# Patient Record
Sex: MALE | Race: WHITE | NOT HISPANIC OR LATINO | Employment: UNEMPLOYED | URBAN - METROPOLITAN AREA
[De-identification: names, ages, dates, MRNs, and addresses within clinical notes are randomized per-mention and may not be internally consistent; named-entity substitution may affect disease eponyms.]

---

## 2023-03-20 ENCOUNTER — TELEPHONE (OUTPATIENT)
Dept: GASTROENTEROLOGY | Facility: CLINIC | Age: 33
End: 2023-03-20
Payer: COMMERCIAL

## 2023-03-20 NOTE — TELEPHONE ENCOUNTER
Patients mom Suellen called stating Clinton's Peg tube button has been leaking , some days more then others but she is really concerned due to the foul odor . She called Dr. Manning and he did put him on anti biotics although she wanted to see if the 28  F  2.7 cm was an option.  States the  last time it was changed the 28 was unavailable? In [facility] the last Peg tube change I see was 7/19/21 and it says a 28 was used ??

## 2023-03-22 NOTE — TELEPHONE ENCOUNTER
Dr. Rodriguez just called and notified me that [facility] just informed him that they do not have that size peg tube available. It is not available for them to order apparently. They are looking for alternate options but we will need to hold off on scheduling patient until [facility] lab can find another option. Disregard Dr. Rodriguez's message saying they were able to order it.  MLC

## 2023-03-23 NOTE — TELEPHONE ENCOUNTER
Ok, I called Mrs Ken and informed her of the situation , she understands and will wait to hear from our office with other options after [facility] gets back with Dr. Rodriguez.

## 2023-07-25 ENCOUNTER — TELEPHONE (OUTPATIENT)
Dept: GASTROENTEROLOGY | Facility: CLINIC | Age: 33
End: 2023-07-25
Payer: COMMERCIAL

## 2023-07-25 NOTE — TELEPHONE ENCOUNTER
----- Message from Neeta Sigala sent at 7/25/2023 10:25 AM CDT -----  Contact: madeline _ suellen  Type:  Sooner Apoointment Request    Caller is requesting a sooner appointment.  Caller declined first available appointment listed below.  Caller will not accept being placed on the waitlist and is requesting a message be sent to doctor.    Name of Caller: Suellen  When is the first available appointment? Oct 2023  Symptoms: seen Dr at previous prac/ change feeding tube/ button  Would the patient rather a call back or a response via [facility]? phone  Best Call Back Number: [phone]  Additional Information:

## 2023-07-25 NOTE — TELEPHONE ENCOUNTER
Do you want to direct schedule it? Last one I see in [facility] was 9/9/2019. Was supposed to have one in 2021 but I dont see an reports on it in either [facility] or [facility]. I remember his mother requesting specific supplies before so I wasn't sure if you needed to see him first. -KG

## 2023-07-31 RX ORDER — BETAINE 1 G/G
POWDER, FOR SOLUTION ORAL
COMMUNITY
Start: 2023-07-24

## 2023-07-31 RX ORDER — CARVEDILOL 6.25 MG/1
1 TABLET ORAL 2 TIMES DAILY
COMMUNITY
Start: 2023-07-01

## 2023-07-31 RX ORDER — CETIRIZINE HYDROCHLORIDE 1 MG/ML
SOLUTION ORAL
COMMUNITY
Start: 2023-06-28

## 2023-07-31 RX ORDER — ENALAPRIL MALEATE 10 MG/1
TABLET ORAL
COMMUNITY
Start: 2023-07-01 | End: 2023-08-10

## 2023-07-31 RX ORDER — LEVOCARNITINE 330 MG/1
TABLET ORAL
COMMUNITY
Start: 2023-07-19

## 2023-07-31 RX ORDER — TRAZODONE HYDROCHLORIDE 50 MG/1
TABLET ORAL
COMMUNITY
Start: 2023-07-01 | End: 2023-08-10

## 2023-07-31 RX ORDER — NITROFURANTOIN 25; 75 MG/1; MG/1
CAPSULE ORAL
COMMUNITY
Start: 2023-06-01

## 2023-07-31 RX ORDER — LEUCOVORIN CALCIUM 25 MG/1
TABLET ORAL
COMMUNITY
Start: 2023-07-01

## 2023-07-31 RX ORDER — SERTRALINE HYDROCHLORIDE 100 MG/1
TABLET, FILM COATED ORAL
COMMUNITY
Start: 2023-07-01

## 2023-07-31 RX ORDER — TRAZODONE HYDROCHLORIDE 100 MG/1
TABLET ORAL
COMMUNITY
Start: 2023-07-01 | End: 2023-08-10

## 2023-07-31 NOTE — TELEPHONE ENCOUNTER
Pts mother stopped by office. The supplier no longers makes the specific item they need. She wanted a face-to-face visit with Dr. Rodriguez. Appt made for 8/10. -KG

## 2023-08-10 ENCOUNTER — OFFICE VISIT (OUTPATIENT)
Dept: GASTROENTEROLOGY | Facility: CLINIC | Age: 33
End: 2023-08-10
Payer: COMMERCIAL

## 2023-08-10 VITALS — HEIGHT: 64 IN | WEIGHT: 95 LBS | BODY MASS INDEX: 16.22 KG/M2

## 2023-08-10 DIAGNOSIS — K94.29 IRRITATION AROUND PERCUTANEOUS ENDOSCOPIC GASTROSTOMY (PEG) TUBE SITE: Primary | ICD-10-CM

## 2023-08-10 PROCEDURE — 1160F RVW MEDS BY RX/DR IN RCRD: CPT | Mod: CPTII,S$GLB,, | Performed by: INTERNAL MEDICINE

## 2023-08-10 PROCEDURE — 4010F PR ACE/ARB THEARPY RXD/TAKEN: ICD-10-PCS | Mod: CPTII,S$GLB,, | Performed by: INTERNAL MEDICINE

## 2023-08-10 PROCEDURE — 1159F PR MEDICATION LIST DOCUMENTED IN MEDICAL RECORD: ICD-10-PCS | Mod: CPTII,S$GLB,, | Performed by: INTERNAL MEDICINE

## 2023-08-10 PROCEDURE — 99205 PR OFFICE/OUTPT VISIT, NEW, LEVL V, 60-74 MIN: ICD-10-PCS | Mod: S$GLB,,, | Performed by: INTERNAL MEDICINE

## 2023-08-10 PROCEDURE — 3008F BODY MASS INDEX DOCD: CPT | Mod: CPTII,S$GLB,, | Performed by: INTERNAL MEDICINE

## 2023-08-10 PROCEDURE — 3008F PR BODY MASS INDEX (BMI) DOCUMENTED: ICD-10-PCS | Mod: CPTII,S$GLB,, | Performed by: INTERNAL MEDICINE

## 2023-08-10 PROCEDURE — 4010F ACE/ARB THERAPY RXD/TAKEN: CPT | Mod: CPTII,S$GLB,, | Performed by: INTERNAL MEDICINE

## 2023-08-10 PROCEDURE — 1159F MED LIST DOCD IN RCRD: CPT | Mod: CPTII,S$GLB,, | Performed by: INTERNAL MEDICINE

## 2023-08-10 PROCEDURE — 99205 OFFICE O/P NEW HI 60 MIN: CPT | Mod: S$GLB,,, | Performed by: INTERNAL MEDICINE

## 2023-08-10 PROCEDURE — 1160F PR REVIEW ALL MEDS BY PRESCRIBER/CLIN PHARMACIST DOCUMENTED: ICD-10-PCS | Mod: CPTII,S$GLB,, | Performed by: INTERNAL MEDICINE

## 2023-08-10 RX ORDER — TRAZODONE HYDROCHLORIDE 150 MG/1
200 TABLET ORAL NIGHTLY
COMMUNITY

## 2023-08-10 RX ORDER — QUETIAPINE FUMARATE 50 MG/1
100 TABLET, FILM COATED ORAL NIGHTLY
COMMUNITY
Start: 2023-08-08

## 2023-08-10 RX ORDER — ENALAPRIL MALEATE 10 MG/1
10 TABLET ORAL
COMMUNITY

## 2023-08-10 RX ORDER — MIRTAZAPINE 15 MG/1
TABLET, FILM COATED ORAL
COMMUNITY
Start: 2023-08-01

## 2023-08-10 NOTE — PROGRESS NOTES
Clinic Note    Reason for visit:  The encounter diagnosis was Irritation around percutaneous endoscopic gastrostomy (PEG) tube site.    PCP: Garo Manning       HPI:  This is a 33 y.o. male who is established. Last seen 9/2019. He has PEG tube and has had skin breakdown around the PEG site. Occasionally has odor from area. They have been using Desitin cream around the area. Stoma length currently 2.7 and uses 28 Fr but these are not made anymore. He will need MiniOne nonballoon low profile g tube 24 FR with 3.4 stoma length from [facility].     9/2019- change of button 20 Fr PEG tub at Bedside.    Review of Systems   Constitutional:  Negative for diaphoresis, fatigue, fever and unexpected weight change.   HENT:  Negative for hearing loss, mouth sores, postnasal drip, sore throat and trouble swallowing.    Eyes:  Negative for pain, discharge and eye dryness.   Respiratory:  Negative for apnea, cough, choking, chest tightness, shortness of breath and wheezing.    Cardiovascular:  Negative for chest pain, palpitations and leg swelling.   Gastrointestinal:  Negative for abdominal distention, abdominal pain, anal bleeding, blood in stool, change in bowel habit, constipation, diarrhea, nausea, rectal pain, vomiting, reflux, fecal incontinence and change in bowel habit.   Genitourinary:  Negative for bladder incontinence, dysuria and hematuria.   Musculoskeletal:  Negative for arthralgias, back pain and joint swelling.   Integumentary:  Negative for color change and rash.   Allergic/Immunologic: Negative for environmental allergies and food allergies.   Neurological:  Negative for seizures and headaches.   Hematological:  Negative for adenopathy. Does not bruise/bleed easily.        Past Medical History:   Diagnosis Date    Aortic regurgitation due to bicuspid aortic valve     Homocystinuria     Hydrocephalus     Methylmalonic acidemia      Past Surgical History:   Procedure Laterality Date    INSERTION OF  "PERCUTANEOUS ENDOSCOPIC GASTROSTOMY (PEG) FEEDING TUBE      SHUNT REVISION       History reviewed. No pertinent family history.  Social History     Tobacco Use    Smoking status: Never    Smokeless tobacco: Never     Review of patient's allergies indicates:  No Known Allergies     Medication List with Changes/Refills   Current Medications    BETAINE 1 GRAM/SCOOP POWD        CARVEDILOL (COREG) 6.25 MG TABLET    Take 1 tablet by mouth 2 (two) times a day.    CETIRIZINE (ZYRTEC) 1 MG/ML SYRUP        ENALAPRIL (VASOTEC) 10 MG TABLET    Take 10 mg by mouth.    HYDROXOCOBALAMIN IM    Inject 1 mL into the muscle. 5x weekly    LEUCOVORIN (WELLCOVORIN) 25 MG TAB        LEVOCARNITINE (CARNITOR) 330 MG TAB        MIRTAZAPINE (REMERON) 15 MG TABLET        NITROFURANTOIN, MACROCRYSTAL-MONOHYDRATE, (MACROBID) 100 MG CAPSULE        QUETIAPINE (SEROQUEL) 50 MG TABLET        SERTRALINE (ZOLOFT) 100 MG TABLET        TRAZODONE (DESYREL) 150 MG TABLET    Take 150 mg by mouth.   Discontinued Medications    ENALAPRIL (VASOTEC) 10 MG TABLET        TRAZODONE (DESYREL) 100 MG TABLET        TRAZODONE (DESYREL) 50 MG TABLET             Vital Signs:  Ht 5' 4" (1.626 m)   Wt 43.1 kg (95 lb)   BMI 16.31 kg/m²         Physical Exam  Constitutional:       General: He is not in acute distress.     Appearance: Normal appearance. He is well-developed. He is not ill-appearing or toxic-appearing.   HENT:      Head: Normocephalic and atraumatic.      Nose: Nose normal.      Mouth/Throat:      Mouth: Mucous membranes are moist.      Pharynx: Oropharynx is clear. No oropharyngeal exudate or posterior oropharyngeal erythema.   Eyes:      General: Lids are normal. No scleral icterus.        Right eye: No discharge.         Left eye: No discharge.      Conjunctiva/sclera: Conjunctivae normal.   Cardiovascular:      Rate and Rhythm: Normal rate and regular rhythm.      Pulses:           Radial pulses are 2+ on the right side and 2+ on the left side. "   Pulmonary:      Effort: Pulmonary effort is normal. No respiratory distress.      Breath sounds: No stridor. No wheezing.   Abdominal:      General: Bowel sounds are normal. There is no distension.      Palpations: Abdomen is soft. There is no fluid wave, hepatomegaly, splenomegaly or mass.      Tenderness: There is no abdominal tenderness. There is no guarding or rebound.      Comments: LUQ PEG   Musculoskeletal:      Cervical back: Full passive range of motion without pain.   Lymphadenopathy:      Cervical: No cervical adenopathy.   Skin:     General: Skin is warm and dry.      Capillary Refill: Capillary refill takes less than 2 seconds.      Coloration: Skin is not cyanotic, jaundiced or pale.   Neurological:      General: No focal deficit present.      Mental Status: He is alert and oriented to person, place, and time.   Psychiatric:         Mood and Affect: Mood normal.         Behavior: Behavior is cooperative.            All of the data above and below has been reviewed by myself and any further interpretations will be reflected in the assessment and plan.   The data includes review of external notes, and independent interpretation of lab results, procedures, x-rays, and imaging reports.      Assessment:  Irritation around percutaneous endoscopic gastrostomy (PEG) tube site         Recommendations:  We will order MiniOne nonballoon low profile g tube 24 FR with 3.4 stoma length from [facility].     Risks, benefits, and alternatives of medical management, any associated procedures, and/or treatment discussed with the patient. Patient given opportunity to ask questions and voices understanding. Patient has elected to proceed with the recommended care modalities as discussed.    Instructed patient to notify my office if they have not been contacted within two weeks after any procedures, submitting any samples (biopsies, blood, stool, urine, etc.) or after any imaging (X-ray, CT, MRI, etc.).     Follow up  if symptoms worsen or fail to improve.    Order summary:  No orders of the defined types were placed in this encounter.         This document may have been created using a voice recognition transcribing system. Incorrect words or phrases may have been missed during proofreading. Please interpret accordingly or contact me for clarification.     Theo Sweeney MD

## 2023-08-14 ENCOUNTER — TELEPHONE (OUTPATIENT)
Dept: GASTROENTEROLOGY | Facility: CLINIC | Age: 33
End: 2023-08-14
Payer: COMMERCIAL

## 2023-08-14 NOTE — TELEPHONE ENCOUNTER
Order form filled out for MiniONE Non-Balloon Low Profile Button G-tube, 24 FR, stoma length 3.4 cm. Per patient's mother, she would like to purchase 2. Order form emailed to [facility].   MLC

## 2023-08-23 ENCOUNTER — DOCUMENTATION ONLY (OUTPATIENT)
Dept: GASTROENTEROLOGY | Facility: CLINIC | Age: 33
End: 2023-08-23
Payer: COMMERCIAL

## 2023-08-28 ENCOUNTER — OUTSIDE PLACE OF SERVICE (OUTPATIENT)
Dept: GASTROENTEROLOGY | Facility: CLINIC | Age: 33
End: 2023-08-28

## 2023-08-28 PROCEDURE — 43246 PR EGD, FLEX, W/PLCMT, GASTROSTOMY TUBE: ICD-10-PCS | Mod: ,,, | Performed by: INTERNAL MEDICINE

## 2023-08-28 PROCEDURE — 43246 EGD PLACE GASTROSTOMY TUBE: CPT | Mod: ,,, | Performed by: INTERNAL MEDICINE

## 2023-09-25 ENCOUNTER — OFFICE VISIT (OUTPATIENT)
Dept: SURGERY | Facility: CLINIC | Age: 33
End: 2023-09-25
Payer: COMMERCIAL

## 2023-09-25 DIAGNOSIS — T85.598A FEEDING TUBE DYSFUNCTION, INITIAL ENCOUNTER: Primary | ICD-10-CM

## 2023-09-25 PROCEDURE — 4010F PR ACE/ARB THEARPY RXD/TAKEN: ICD-10-PCS | Mod: CPTII,S$GLB,, | Performed by: SURGERY

## 2023-09-25 PROCEDURE — 4010F ACE/ARB THERAPY RXD/TAKEN: CPT | Mod: CPTII,S$GLB,, | Performed by: SURGERY

## 2023-09-25 PROCEDURE — 99204 OFFICE O/P NEW MOD 45 MIN: CPT | Mod: S$GLB,,, | Performed by: SURGERY

## 2023-09-25 PROCEDURE — 1159F MED LIST DOCD IN RCRD: CPT | Mod: CPTII,S$GLB,, | Performed by: SURGERY

## 2023-09-25 PROCEDURE — 1160F RVW MEDS BY RX/DR IN RCRD: CPT | Mod: CPTII,S$GLB,, | Performed by: SURGERY

## 2023-09-25 PROCEDURE — 1160F PR REVIEW ALL MEDS BY PRESCRIBER/CLIN PHARMACIST DOCUMENTED: ICD-10-PCS | Mod: CPTII,S$GLB,, | Performed by: SURGERY

## 2023-09-25 PROCEDURE — 1159F PR MEDICATION LIST DOCUMENTED IN MEDICAL RECORD: ICD-10-PCS | Mod: CPTII,S$GLB,, | Performed by: SURGERY

## 2023-09-25 PROCEDURE — 99204 PR OFFICE/OUTPT VISIT, NEW, LEVL IV, 45-59 MIN: ICD-10-PCS | Mod: S$GLB,,, | Performed by: SURGERY

## 2023-09-25 RX ORDER — RISPERIDONE 0.5 MG/1
TABLET ORAL
COMMUNITY
Start: 2023-09-21

## 2023-09-25 NOTE — PROGRESS NOTES
Subjective:       Patient ID: Clinton Cota is a 33 y.o. male.    Chief Complaint: Other (Recently had peg tube replaced - went from 28fr to 24fr. Peg tube worked fine with no leaking until pt pulled tube out. Went to ER and had replaced and has been leaking since. Was referred for further eval for leaking.)      33-year-old male who for several years has had a PEG tube which was placed by Gastroenterology team.  Patient has had leaking from around the tube, had a recent endoscopy where the feeding tube was down sized, clips were placed around the site to try and close off the gastrotomy.  Patient is still having significant drainage from around the tube.      Review of Systems   Constitutional:  Negative for chills, fatigue and fever.   HENT:  Negative for nasal congestion and rhinorrhea.    Respiratory:  Negative for shortness of breath and wheezing.    Cardiovascular:  Negative for chest pain and palpitations.   Gastrointestinal:  Negative for abdominal pain, blood in stool, diarrhea, nausea and vomiting.   Endocrine: Negative for cold intolerance and heat intolerance.   Genitourinary:  Negative for difficulty urinating.   Musculoskeletal:  Negative for joint swelling and myalgias.   Integumentary:  Negative for rash and wound.   Neurological:  Negative for weakness, light-headedness and numbness.   Psychiatric/Behavioral:  Negative for agitation and confusion.          Objective:      Physical Exam  Vitals reviewed.   Constitutional:       Appearance: He is well-developed.   HENT:      Head: Normocephalic and atraumatic.   Eyes:      Conjunctiva/sclera: Conjunctivae normal.   Neck:      Trachea: Trachea normal.   Cardiovascular:      Rate and Rhythm: Normal rate and regular rhythm.   Pulmonary:      Effort: Pulmonary effort is normal.      Breath sounds: Normal breath sounds.   Abdominal:      General: There is no distension.      Palpations: Abdomen is soft.      Tenderness: There is no abdominal  tenderness. There is no guarding.      Hernia: No hernia is present.          Comments: Left upper quadrant gastrostomy tube with bile leakage   Musculoskeletal:         General: Normal range of motion.      Cervical back: Normal range of motion.   Skin:     General: Skin is warm and dry.   Neurological:      Mental Status: He is alert and oriented to person, place, and time.   Psychiatric:         Speech: Speech normal.         Behavior: Behavior normal.         Assessment:       Problem List Items Addressed This Visit    None  Visit Diagnoses       Feeding tube dysfunction, initial encounter    -  Primary            Plan:       Had an extensive conversation with the patient's family regarding options to revise the G-tube.  Considering the patient has had this tracked for quite some time and it is large and leaking around the tube I recommend robotic takedown of the gastrostomy tube and creation of a new gastrostomy.  Patient's family would like to proceed with surgery we will have him scheduled.

## 2023-10-10 ENCOUNTER — OUTSIDE PLACE OF SERVICE (OUTPATIENT)
Dept: SURGERY | Facility: CLINIC | Age: 33
End: 2023-10-10
Payer: COMMERCIAL

## 2023-10-10 PROCEDURE — 43653 PR LAP,GASTROSTOMY,W/O TUBE CONSTR: ICD-10-PCS | Mod: ,,, | Performed by: SURGERY

## 2023-10-10 PROCEDURE — 43653 LAPAROSCOPY GASTROSTOMY: CPT | Mod: ,,, | Performed by: SURGERY

## 2023-10-12 ENCOUNTER — TELEPHONE (OUTPATIENT)
Dept: SURGERY | Facility: CLINIC | Age: 33
End: 2023-10-12
Payer: COMMERCIAL

## 2023-10-12 NOTE — TELEPHONE ENCOUNTER
----- Message from Neeta Sigala sent at 10/12/2023  9:04 AM CDT -----  Contact: Pt mother _ Suellen  Wants to discuss that if the pt needs a 2 week follow up/ states that there is a big band aid that is stuck to the pt's skin/ states she was informed to leave it on but in the shower it got soaked and is wanting to discuss if she needs to leave it on or change it out when it get's wet / pt mother can be reached at [phone]//thanks/dbw

## 2023-10-12 NOTE — TELEPHONE ENCOUNTER
Returned call to pt mom. Advised her to change bandage If it gets wet. She voiced understanding - VL

## 2023-10-17 ENCOUNTER — TELEPHONE (OUTPATIENT)
Dept: PAIN MEDICINE | Facility: CLINIC | Age: 33
End: 2023-10-17
Payer: COMMERCIAL

## 2023-10-17 NOTE — TELEPHONE ENCOUNTER
Called and spoke to patient's mom, she wanted to let Dr. Cordero know that the wound site was red and swollen and wanted to know if patient can be seen. I voiced that he has clinic on Monday's and I can put him on schedule for the 23rd, mom did talk to Monika the nurse to let her know what's going on with the patient. She schedule patient for the 23 and message was sent to Dr. Cordero about what's going on with patient.

## 2023-10-23 ENCOUNTER — OFFICE VISIT (OUTPATIENT)
Dept: SURGERY | Facility: CLINIC | Age: 33
End: 2023-10-23
Payer: COMMERCIAL

## 2023-10-23 DIAGNOSIS — T85.598A FEEDING TUBE DYSFUNCTION, INITIAL ENCOUNTER: Primary | ICD-10-CM

## 2023-10-23 PROCEDURE — 99024 PR POST-OP FOLLOW-UP VISIT: ICD-10-PCS | Mod: S$GLB,,, | Performed by: SURGERY

## 2023-10-23 PROCEDURE — 1160F PR REVIEW ALL MEDS BY PRESCRIBER/CLIN PHARMACIST DOCUMENTED: ICD-10-PCS | Mod: CPTII,S$GLB,, | Performed by: SURGERY

## 2023-10-23 PROCEDURE — 1160F RVW MEDS BY RX/DR IN RCRD: CPT | Mod: CPTII,S$GLB,, | Performed by: SURGERY

## 2023-10-23 PROCEDURE — 4010F PR ACE/ARB THEARPY RXD/TAKEN: ICD-10-PCS | Mod: CPTII,S$GLB,, | Performed by: SURGERY

## 2023-10-23 PROCEDURE — 1159F MED LIST DOCD IN RCRD: CPT | Mod: CPTII,S$GLB,, | Performed by: SURGERY

## 2023-10-23 PROCEDURE — 99024 POSTOP FOLLOW-UP VISIT: CPT | Mod: S$GLB,,, | Performed by: SURGERY

## 2023-10-23 PROCEDURE — 4010F ACE/ARB THERAPY RXD/TAKEN: CPT | Mod: CPTII,S$GLB,, | Performed by: SURGERY

## 2023-10-23 PROCEDURE — 1159F PR MEDICATION LIST DOCUMENTED IN MEDICAL RECORD: ICD-10-PCS | Mod: CPTII,S$GLB,, | Performed by: SURGERY

## 2023-10-23 NOTE — PROGRESS NOTES
2 weeks status post takedown of old gastrostomy site with placement of new G-tube.  Patient started having some skin irritation underneath the bumper, patient's mother started local wound care under the bumper and placing gauze under the bumper.  The skin irritation is significantly improved.  The takedown site is still draining a tiny amount of serous fluid, the surrounding erythema significantly improved.    The patient will follow up with Gastroenterology team to have the tube switched out into a button  The patient will also follow up with me in 6 weeks to evaluate the takedown site to ensure that it is completely closed down.